# Patient Record
Sex: MALE | Race: WHITE | NOT HISPANIC OR LATINO | Employment: OTHER | URBAN - NONMETROPOLITAN AREA
[De-identification: names, ages, dates, MRNs, and addresses within clinical notes are randomized per-mention and may not be internally consistent; named-entity substitution may affect disease eponyms.]

---

## 2023-01-03 ENCOUNTER — DOCUMENTATION (OUTPATIENT)
Dept: FAMILY MEDICINE CLINIC | Facility: CLINIC | Age: 88
End: 2023-01-03
Payer: MEDICARE

## 2023-01-03 PROBLEM — I49.3 FREQUENT PVCS: Status: RESOLVED | Noted: 2023-01-03 | Resolved: 2023-01-03

## 2023-01-03 PROBLEM — N39.0 URINARY TRACT INFECTION: Status: ACTIVE | Noted: 2023-01-03

## 2023-01-03 PROBLEM — D32.9 MENINGIOMA: Status: ACTIVE | Noted: 2023-01-03

## 2023-01-03 PROBLEM — Z98.890 HISTORY OF OPEN REDUCTION AND INTERNAL FIXATION (ORIF) PROCEDURE: Status: ACTIVE | Noted: 2023-01-03

## 2023-01-03 PROBLEM — M62.82 RHABDOMYOLYSIS: Status: ACTIVE | Noted: 2023-01-03

## 2023-01-03 PROBLEM — I49.3 FREQUENT PVCS: Status: ACTIVE | Noted: 2023-01-03

## 2023-01-03 PROBLEM — Z91.81 PERSONAL HISTORY OF FALL: Status: ACTIVE | Noted: 2023-01-03

## 2023-01-03 PROBLEM — Z74.1 NEED FOR ASSISTANCE WITH PERSONAL CARE: Status: ACTIVE | Noted: 2023-01-03

## 2023-01-03 RX ORDER — ACETAMINOPHEN 500 MG
1000 TABLET ORAL
COMMUNITY

## 2023-01-03 RX ORDER — ENOXAPARIN SODIUM 100 MG/ML
40 INJECTION SUBCUTANEOUS DAILY
COMMUNITY

## 2023-01-03 RX ORDER — SENNA PLUS 8.6 MG/1
1 TABLET ORAL 2 TIMES DAILY
COMMUNITY

## 2023-01-05 ENCOUNTER — NURSING HOME (OUTPATIENT)
Dept: INTERNAL MEDICINE | Facility: CLINIC | Age: 88
End: 2023-01-05
Payer: MEDICARE

## 2023-01-05 VITALS
WEIGHT: 151 LBS | TEMPERATURE: 97 F | OXYGEN SATURATION: 98 % | DIASTOLIC BLOOD PRESSURE: 60 MMHG | RESPIRATION RATE: 18 BRPM | SYSTOLIC BLOOD PRESSURE: 152 MMHG | HEART RATE: 65 BPM

## 2023-01-05 DIAGNOSIS — N30.00 ACUTE CYSTITIS WITHOUT HEMATURIA: ICD-10-CM

## 2023-01-05 DIAGNOSIS — M89.9 LYTIC BONE LESIONS ON XRAY: ICD-10-CM

## 2023-01-05 DIAGNOSIS — Z98.890 HISTORY OF OPEN REDUCTION AND INTERNAL FIXATION (ORIF) PROCEDURE: ICD-10-CM

## 2023-01-05 DIAGNOSIS — M62.82 NON-TRAUMATIC RHABDOMYOLYSIS: Primary | ICD-10-CM

## 2023-01-05 DIAGNOSIS — R54 AGE-RELATED PHYSICAL DEBILITY: ICD-10-CM

## 2023-01-05 DIAGNOSIS — I49.3 FREQUENT PVCS: ICD-10-CM

## 2023-01-05 PROCEDURE — 99305 1ST NF CARE MODERATE MDM 35: CPT | Performed by: INTERNAL MEDICINE

## 2023-01-05 NOTE — LETTER
Nursing Home History and Physical       Navin Ibarra DO  [address] [place] [address] Phone: [phone]  Fax: [phone]     PATIENT NAME: Aannda Sanford                                                                          YOB: 1919           DATE OF SERVICE: 01/05/2023  FACILITY:  [facility]    CHIEF COMPLAINT:  Nursing facility admission      HISTORY OF PRESENT ILLNESS:   Patient is a 89-year-old gentleman who has been transferred from [facility] following hospitalization for UTI and generalized weakness.  Patient was also treated for rhabdomyolysis with IV fluids for dehydration.  Through his stay at [facility], patient seemed to be very poorly motivated to take medications or work with physical therapy.  He was avoiding narcotics but did not have sufficient control with Tylenol.  With any medications, patient seemed uninterested.  He did suffer with hyperkalemia which improved with Lokelma during rehab.    On exam today, patient seemed very uninterested as he understands that he is essentially bedridden.  He has not been getting up and doing well with working with physical therapy.  Appetite has been is reasonable.        PAST MEDICAL & SURGICAL HISTORY:   Past Medical History:   Diagnosis Date   • Generalized weakness    • Lytic bone lesions     SKULL      Past Surgical History:   Procedure Laterality Date   • ORIF HIP FRACTURE Left          MEDICATIONS:  I have reviewed and reconciled the patients medication list in the patients chart at the skilled nursing facility on 01/05/2023.      ALLERGIES:  No Known Allergies      SOCIAL HISTORY:  Social History     Socioeconomic History   • Marital status:    Tobacco Use   • Smoking status: Unknown   Substance and Sexual Activity   • Alcohol use: Defer   • Drug use: Defer   • Sexual activity: Defer       FAMILY HISTORY:  Family History   Family history unknown: Yes        REVIEW OF SYSTEMS:  Review of  Systems   Constitutional: Positive for fatigue. Negative for chills and fever.   HENT: Negative for congestion, ear pain, rhinorrhea, sinus pressure and sore throat.    Eyes: Negative for visual disturbance.   Respiratory: Negative for cough, chest tightness, shortness of breath and wheezing.    Cardiovascular: Negative for chest pain, palpitations and leg swelling.   Gastrointestinal: Negative for abdominal pain, blood in stool, constipation, diarrhea, nausea and vomiting.   Endocrine: Negative for polydipsia and polyuria.   Genitourinary: Negative for dysuria and hematuria.   Musculoskeletal: Negative for arthralgias and back pain.   Skin: Negative for rash.   Neurological: Negative for dizziness, light-headedness, numbness and headaches.   Psychiatric/Behavioral: Positive for dysphoric mood. Negative for sleep disturbance. The patient is not nervous/anxious.          PHYSICAL EXAMINATION:   VITAL SIGNS: /60   Pulse 65   Temp 97 °F (36.1 °C)   Resp 18   Wt 68.5 kg (151 lb)   SpO2 98%     Physical Exam  Vitals and nursing note reviewed.   Constitutional:       Appearance: Normal appearance. He is well-developed.      Comments: Frail bedridden elderly male in no acute distress   HENT:      Head: Normocephalic and atraumatic.      Nose: Nose normal.      Mouth/Throat:      Mouth: Mucous membranes are moist.      Pharynx: No oropharyngeal exudate.   Eyes:      General: No scleral icterus.     Conjunctiva/sclera: Conjunctivae normal.      Pupils: Pupils are equal, round, and reactive to light.   Neck:      Thyroid: No thyromegaly.   Cardiovascular:      Rate and Rhythm: Normal rate and regular rhythm.      Heart sounds: Normal heart sounds. No murmur heard.    No friction rub. No gallop.   Pulmonary:      Effort: Pulmonary effort is normal. No respiratory distress.      Breath sounds: Normal breath sounds. No wheezing.   Abdominal:      General: Bowel sounds are normal. There is no distension.       Palpations: Abdomen is soft.      Tenderness: There is no abdominal tenderness.   Musculoskeletal:         General: No deformity or signs of injury.      Cervical back: Normal range of motion and neck supple.   Lymphadenopathy:      Cervical: No cervical adenopathy.   Skin:     General: Skin is warm and dry.      Findings: No rash.   Neurological:      Mental Status: He is alert and oriented to person, place, and time.   Psychiatric:         Mood and Affect: Mood normal.         Behavior: Behavior normal.         RECORDS REVIEW:   Discharge Summary from [facility] 12/30/2022    ASSESSMENT   There are no diagnoses linked to this encounter.    PLAN  Age-related weakness and debility  - Patient remains bedridden and requires significant assistance from nursing staff for basic ADLs.  - Unfortunately if the patient does not make good progress with physical therapy, I would suggest that long-term nursing care should be considered    DVT prophylaxis  - Continue Lovenox.    Acute rhabdo diagnosis  - Recovered with IV fluids.  P.o. intake remains reasonable in facility.    Recurrent falls  - Continue physical therapy in facility.    Skull lytic lesions  - Unclear if further evaluation has been done by PCP or specialist.     History of meningioma  - Stable.          [x]  Discussed Patient in detail with nursing/staff, addressed all needs today.     [x]  Plan of Care Reviewed   [x]  PT/OT Reviewed   []  Order Changes  []  Discharge Plans Reviewed  [x]  Advance Directive on file with Nursing Home.   [x]  POA on file with Nursing Home.    [x]  Code Status listed and reviewed.       Navin Ibarra DO.  1/13/2023      **Part of this note may be an electronic transcription/translation of spoken language to printed text using the Dragon Dictation System.**

## 2023-01-13 NOTE — PROGRESS NOTES
Nursing Home History and Physical       Navin Ibarra DO  [address] [place] [address] Phone: [phone]  Fax: [phone]     PATIENT NAME: Ananda Sanford                                                                          YOB: 1919           DATE OF SERVICE: 01/05/2023  FACILITY:  [facility]    CHIEF COMPLAINT:  Nursing facility admission      HISTORY OF PRESENT ILLNESS:   Patient is a 89-year-old gentleman who has been transferred from [facility] following hospitalization for UTI and generalized weakness.  Patient was also treated for rhabdomyolysis with IV fluids for dehydration.  Through his stay at [facility], patient seemed to be very poorly motivated to take medications or work with physical therapy.  He was avoiding narcotics but did not have sufficient control with Tylenol.  With any medications, patient seemed uninterested.  He did suffer with hyperkalemia which improved with Lokelma during rehab.    On exam today, patient seemed very uninterested as he understands that he is essentially bedridden.  He has not been getting up and doing well with working with physical therapy.  Appetite has been is reasonable.        PAST MEDICAL & SURGICAL HISTORY:   Past Medical History:   Diagnosis Date   • Generalized weakness    • Lytic bone lesions     SKULL      Past Surgical History:   Procedure Laterality Date   • ORIF HIP FRACTURE Left          MEDICATIONS:  I have reviewed and reconciled the patients medication list in the patients chart at the skilled nursing facility on 01/05/2023.      ALLERGIES:  No Known Allergies      SOCIAL HISTORY:  Social History     Socioeconomic History   • Marital status:    Tobacco Use   • Smoking status: Unknown   Substance and Sexual Activity   • Alcohol use: Defer   • Drug use: Defer   • Sexual activity: Defer       FAMILY HISTORY:  Family History   Family history unknown: Yes        REVIEW OF SYSTEMS:  Review of  Systems   Constitutional: Positive for fatigue. Negative for chills and fever.   HENT: Negative for congestion, ear pain, rhinorrhea, sinus pressure and sore throat.    Eyes: Negative for visual disturbance.   Respiratory: Negative for cough, chest tightness, shortness of breath and wheezing.    Cardiovascular: Negative for chest pain, palpitations and leg swelling.   Gastrointestinal: Negative for abdominal pain, blood in stool, constipation, diarrhea, nausea and vomiting.   Endocrine: Negative for polydipsia and polyuria.   Genitourinary: Negative for dysuria and hematuria.   Musculoskeletal: Negative for arthralgias and back pain.   Skin: Negative for rash.   Neurological: Negative for dizziness, light-headedness, numbness and headaches.   Psychiatric/Behavioral: Positive for dysphoric mood. Negative for sleep disturbance. The patient is not nervous/anxious.          PHYSICAL EXAMINATION:   VITAL SIGNS: /60   Pulse 65   Temp 97 °F (36.1 °C)   Resp 18   Wt 68.5 kg (151 lb)   SpO2 98%     Physical Exam  Vitals and nursing note reviewed.   Constitutional:       Appearance: Normal appearance. He is well-developed.      Comments: Frail bedridden elderly male in no acute distress   HENT:      Head: Normocephalic and atraumatic.      Nose: Nose normal.      Mouth/Throat:      Mouth: Mucous membranes are moist.      Pharynx: No oropharyngeal exudate.   Eyes:      General: No scleral icterus.     Conjunctiva/sclera: Conjunctivae normal.      Pupils: Pupils are equal, round, and reactive to light.   Neck:      Thyroid: No thyromegaly.   Cardiovascular:      Rate and Rhythm: Normal rate and regular rhythm.      Heart sounds: Normal heart sounds. No murmur heard.    No friction rub. No gallop.   Pulmonary:      Effort: Pulmonary effort is normal. No respiratory distress.      Breath sounds: Normal breath sounds. No wheezing.   Abdominal:      General: Bowel sounds are normal. There is no distension.       Palpations: Abdomen is soft.      Tenderness: There is no abdominal tenderness.   Musculoskeletal:         General: No deformity or signs of injury.      Cervical back: Normal range of motion and neck supple.   Lymphadenopathy:      Cervical: No cervical adenopathy.   Skin:     General: Skin is warm and dry.      Findings: No rash.   Neurological:      Mental Status: He is alert and oriented to person, place, and time.   Psychiatric:         Mood and Affect: Mood normal.         Behavior: Behavior normal.         RECORDS REVIEW:   Discharge Summary from [facility] 12/30/2022    ASSESSMENT   There are no diagnoses linked to this encounter.    PLAN  Age-related weakness and debility  - Patient remains bedridden and requires significant assistance from nursing staff for basic ADLs.  - Unfortunately if the patient does not make good progress with physical therapy, I would suggest that long-term nursing care should be considered    DVT prophylaxis  - Continue Lovenox.    Acute rhabdo diagnosis  - Recovered with IV fluids.  P.o. intake remains reasonable in facility.    Recurrent falls  - Continue physical therapy in facility.    Skull lytic lesions  - Unclear if further evaluation has been done by PCP or specialist.     History of meningioma  - Stable.          [x]  Discussed Patient in detail with nursing/staff, addressed all needs today.     [x]  Plan of Care Reviewed   [x]  PT/OT Reviewed   []  Order Changes  []  Discharge Plans Reviewed  [x]  Advance Directive on file with Nursing Home.   [x]  POA on file with Nursing Home.    [x]  Code Status listed and reviewed.       Navin Ibarra DO.  1/13/2023      **Part of this note may be an electronic transcription/translation of spoken language to printed text using the Dragon Dictation System.**